# Patient Record
Sex: FEMALE
[De-identification: names, ages, dates, MRNs, and addresses within clinical notes are randomized per-mention and may not be internally consistent; named-entity substitution may affect disease eponyms.]

---

## 2023-07-03 ENCOUNTER — APPOINTMENT (OUTPATIENT)
Dept: AFTER HOURS CARE | Facility: EMERGENCY ROOM | Age: 43
End: 2023-07-03
Payer: COMMERCIAL

## 2023-07-03 DIAGNOSIS — R05.9 COUGH, UNSPECIFIED: ICD-10-CM

## 2023-07-03 PROBLEM — Z00.00 ENCOUNTER FOR PREVENTIVE HEALTH EXAMINATION: Status: ACTIVE | Noted: 2023-07-03

## 2023-07-03 PROCEDURE — 99204 OFFICE O/P NEW MOD 45 MIN: CPT | Mod: 95

## 2023-07-03 RX ORDER — AZITHROMYCIN 250 MG/1
250 TABLET, FILM COATED ORAL
Qty: 1 | Refills: 0 | Status: ACTIVE | COMMUNITY
Start: 2023-07-03 | End: 1900-01-01

## 2023-07-03 RX ORDER — PREDNISONE 20 MG/1
20 TABLET ORAL DAILY
Qty: 8 | Refills: 0 | Status: ACTIVE | COMMUNITY
Start: 2023-07-03 | End: 1900-01-01

## 2023-07-03 NOTE — ASSESSMENT
[FreeTextEntry1] : 10 days of cough without associated symptoms. No red flags on examination. No real clinical concern for PE despite\par OCPs given the patient has sick contact with essentially the same symptoms. Reassuring physical exam. At this\par time patient does not require in person evaluation in the ED or urgent care.

## 2023-07-03 NOTE — HISTORY OF PRESENT ILLNESS
[Home] : at home, [unfilled] , at the time of the visit. [Other Location: e.g. Home (Enter Location, City,State)___] : at [unfilled] [Verbal consent obtained from patient] : the patient, [unfilled] [FreeTextEntry8] : This is a 43-year-old woman with no significant past medical history presents with approximately 10 days of\par nonproductive cough. Cough is episodic, worsening with exertion, not associate with shortness of breath, chest pain,\par lightheadedness, back pain. She has no fevers or chills. She has no congestion. She has no aches or pains or\par malaise. She has a sick contact (boyfriend), who has had the same symptoms for approximately 2 to 3 weeks.\par Patient states that her symptoms do not worsening but also not improving. She has no lower extremity swelling.\par She has no history of asthma. She has no other complaints. She has tried over-the-counter medications without any\par improvement of symptoms.\par PSH umbilical hernia repair\par Meds -- OCPs\par entirely benign PE. Occasional episodic cough. No wheeze or respiratory distress/tachypnea

## 2023-07-03 NOTE — PLAN
[FreeTextEntry1] : Prednisone 20 mg twice daily x4 days\par Z-Marco\par Continue with over-the-counter antitussive medications\par Recommended following up with PCP/urgent care for in person evaluation if symptoms do not improve as she will\par likely require chest x-ray at that point.\par Very strict ED precautions provided